# Patient Record
Sex: MALE | Race: WHITE | Employment: OTHER | URBAN - METROPOLITAN AREA
[De-identification: names, ages, dates, MRNs, and addresses within clinical notes are randomized per-mention and may not be internally consistent; named-entity substitution may affect disease eponyms.]

---

## 2018-10-22 ENCOUNTER — HOSPITAL ENCOUNTER (EMERGENCY)
Facility: CLINIC | Age: 28
Discharge: HOME | End: 2018-10-22
Attending: EMERGENCY MEDICINE | Admitting: EMERGENCY MEDICINE

## 2018-10-22 VITALS
HEIGHT: 70 IN | DIASTOLIC BLOOD PRESSURE: 80 MMHG | BODY MASS INDEX: 19.76 KG/M2 | SYSTOLIC BLOOD PRESSURE: 135 MMHG | RESPIRATION RATE: 18 BRPM | TEMPERATURE: 97.4 F | WEIGHT: 138 LBS | OXYGEN SATURATION: 99 %

## 2018-10-22 DIAGNOSIS — F10.929 ALCOHOLIC INTOXICATION WITH COMPLICATION (H): ICD-10-CM

## 2018-10-22 DIAGNOSIS — F99 MENTAL HEALTH DISORDER: ICD-10-CM

## 2018-10-22 PROCEDURE — 99282 EMERGENCY DEPT VISIT SF MDM: CPT

## 2018-10-22 NOTE — ED PROVIDER NOTES
"  History     Chief Complaint:  Psychiatric Evaluation    HPI   Jose Manuel Pham is a 28 year old male who presents by EMS for a psychiatric evaluation. the patient states that he is coming from [place] to visit the [facility] as he shares will hopefully bring him some happiness after a break up with his ex boyfriend, whom he lived with in [place]. On the flight, however, passengers complained about the patient touching himself inappropriately, leading to the police and paramedics escorting him after the flight landed. The patient denies these accusations and is questioning as to why he is here in the ED to begin with. The patient shares that he was addicted to crystal meth, but has been off of it for a month and a half, which has provoked him to drink at times to cope with his quitting, stating that drinking \"eases his mind\".  The patient also shares that he was in treatment at a dual diagnosis program in [place], and was on his fifth day before leaving the program prematurely 2 days ago. Currently, he denies any thoughts of hurting himself or others.  He admits to drinking alcohol today.  His request is to be discharged once that he can get his belongings and go to the hotel he has already booked.  Breathalyzer was 0.19 prior to arrival.    Allergies:  No Known Drug Allergies    Medications:    The patient is not currently taking any prescribed medications.    Past Medical History:    Bipolar disorder - per patient, diagnosed at treatment facility within past week    Past Surgical History:    History reviewed. No pertinent past surgical history.    Family History:    The patient denies any relevant family medical history.    Social History:  Marital Status: Single  Smoking Status: Yes  Alcohol Use: Yes    Review of Systems   All other systems reviewed and are negative.    Physical Exam     Patient Vitals for the past 24 hrs:   BP Temp Temp src Heart Rate Resp SpO2 Height Weight   10/22/18 1834 (!) 145/91 97.4  F " "(36.3  C) Oral 88 16 98 % 1.778 m (5' 10\") 62.6 kg (138 lb)       Physical Exam  General: Nontoxic-appearing male who ambulated into [facility]  HENT: mucous membranes moist  Eyes: PERRL without nystagmus  CV: extremities well perfused, regular rhythm  Resp:  normal effort, clear throughout  GI: abdomen soft and nontender, no guarding  MSK: no bony tenderness   Skin: appropriately warm and dry  Neuro: alert, clear speech, oriented, normal tone in extremities, ambulatory  Psych:  calm, cooperative, denies feeling suicidal, no evidence of hallucinations, good eye contact, occasionally slightly tearful when discussing recent breakup      Emergency Department Course     Emergency Department Course:  The patient arrived in the emergency department via EMS.    Past medical records, nursing notes, and vitals reviewed.  1818: I performed an exam of the patient and obtained history, as documented above.    The patient passed a \"road test\" prior to discharge from the ED.  The patient passed a PO challenge prior to discharge from the ED.    1904: I re checked the patient.  He continues to deny any acute psychiatric symptoms.     I rechecked the patient. Findings and plan explained to the Patient. Patient discharged home with instructions regarding supportive care, medications, and reasons to return. The importance of close follow-up was reviewed.     Impression & Plan      Medical Decision Making:  He admits to drinking alcohol tonight and apparently made some inappropriate gestures on the airplane, though he does not show any evidence of hallucinations at this time and adamantly and repeatedly volunteers the fact that he is not suicidal or homicidal.  I did not feel he could be kept further against as well.  He did not wish to be evaluated by DEC.  Decompensated psychiatric diseases were considered but I simply do not find evidence of such at this time.  Do not think he requires diagnostic testing.  Ultimately his request for " "discharge was granted.  He is welcome back for crisis at any hour.    Diagnosis:    ICD-10-CM   1. Alcoholic intoxication with complication (H) F10.929   2. Mental health disorder F99       Disposition:  discharged to home        This record was created at least in part using electronic voice recognition software, so please excuse any \"typos.\"    Kameron Teague  10/22/2018    EMERGENCY DEPARTMENT    I, Kameron Teague, am serving as a scribe at 6:18 PM on 10/22/2018 to document services personally performed by Ellis Velasquez MD* based on my observations and the provider's statements to me.        Ellis Velasquez MD  10/23/18 0044    "

## 2018-10-22 NOTE — ED AVS SNAPSHOT
Emergency Department    [address] [place] [address]    Phone:  [phone]    Fax:  [phone]                                       Jose Manuel Pham   MRN: [id number]    Department:   Emergency Department   Date of Visit:  10/22/2018           After Visit Summary Signature Page     I have received my discharge instructions, and my questions have been answered. I have discussed any challenges I see with this plan with the nurse or doctor.    ..........................................................................................................................................  Patient/Patient Representative Signature      ..........................................................................................................................................  Patient Representative Print Name and Relationship to Patient    ..................................................               ................................................  Date                                   Time    ..........................................................................................................................................  Reviewed by Signature/Title    ...................................................              ..............................................  Date                                               Time          22EPIC Rev 08/18

## 2018-10-22 NOTE — ED NOTES
Bed: [facility]  Expected date:   Expected time:   Means of arrival:   Comments:  yenifer 535 28M crisis eval hold ETA 1813     Blessing Knight, RIGOBERTO  10/22/18 1819

## 2018-10-22 NOTE — ED AVS SNAPSHOT
Emergency Department    [address] [place] [address]    Phone:  [phone]    Fax:  [phone]                                       Jose Manuel Pham   MRN: [id number]    Department:   Emergency Department   Date of Visit:  10/22/2018           Patient Information     Date Of Birth          1990        Your diagnoses for this visit were:     Alcoholic intoxication with complication (H)     Mental health disorder        You were seen by Ellis Velasquez MD.      Follow-up Information     Follow up with your treatment facility. Schedule an appointment as soon as possible for a visit in 3 days.        Follow up with nearest ER.    Why:  As needed for crisis        Discharge Instructions       Discharge Instructions  Mental Health Concerns    You were seen today for mental health concerns, such as depression, anxiety, or suicidal thinking. Your provider feels that you do not require hospitalization at this time. However, your symptoms may become worse, and you may need to return to the Emergency Department. Most treatments of depression and suicidal thoughts are a process rather than a single intervention.  Medications and counseling can take several weeks or more to help.    Generally, every Emergency Department visit should have a follow-up clinic visit with either a primary or a specialty clinic/provider. Please follow-up as instructed by your emergency provider today.    By accepting these discharge instructions:    You promise to not harm yourself or others.    You agree that if you feel you are becoming unable to keep that promise, you will do something to help yourself before you do anything to harm yourself or others.     You agree to keep any safety plan arranged on your visit here today.    You agree to take any medication prescribed or recommended by your provider.    If you are getting worse, you can contact a friend or a family member, contact your counselor or  family provider, contact a crisis line, or other options discussed with the provider or therapist today.    At any time, you can call 911 and return to the Emergency Department for more help.    You understand that follow-up is essential to your treatment, and you will make and keep appointments recommended on your visit today.    How to improve your mental health and prevent suicide:    Involve others by letting family, friends, counselors know.  Do not isolate yourself.    Avoid alcohol or drugs. Remove weapons, poisons from your home.    Try to stick to routines for eating, sleeping and getting regular exercise.      Try to get into sunlight. Bright natural light not only treats seasonal affective disorder but also depression.    Increase safe activities that you enjoy.    If you feel worse, contact [phone] ([phone]), or call 911, or your primary provider/counselor for additional assistance.    If you were given a prescription for medicine here today, be sure to read all of the information (including the package insert) that comes with your prescription.  This will include important information about the medicine, its side effects, and any warnings that you need to know about.  The pharmacist who fills the prescription can provide more information and answer questions you may have about the medicine.  If you have questions or concerns that the pharmacist cannot address, please call or return to the Emergency Department.   Remember that you can always come back to the Emergency Department if you are not able to see your regular provider in the amount of time listed above, if you get any new symptoms, or if there is anything that worries you.      Discharge References/Attachments     ALCOHOL INTOXICATION (ENGLISH)      24 Hour Appointment Hotline       To make an appointment at any [facility], call [phone] ([phone]). If you don't have a family doctor or clinic, we will help you find  one. [facility] are conveniently located to serve the needs of you and your family.             Review of your medicines      Notice     You have not been prescribed any medications.            Orders Needing Specimen Collection     None      Pending Results     No orders found from 10/20/2018 to 10/23/2018.            Pending Culture Results     No orders found from 10/20/2018 to 10/23/2018.            Pending Results Instructions     If you had any lab results that were not finalized at the time of your Discharge, you can call the ED Lab Result RN at [phone]. You will be contacted by this team for any positive Lab results or changes in treatment. The nurses are available 7 days a week from 10A to 6:30P.  You can leave a message 24 hours per day and they will return your call.        Test Results From Your Hospital Stay               Clinical Quality Measure: Blood Pressure Screening     Your blood pressure was checked while you were in the emergency department today. The last reading we obtained was  BP: (!) 145/91 . Please read the guidelines below about what these numbers mean and what you should do about them.  If your systolic blood pressure (the top number) is less than 120 and your diastolic blood pressure (the bottom number) is less than 80, then your blood pressure is normal. There is nothing more that you need to do about it.  If your systolic blood pressure (the top number) is 120-139 or your diastolic blood pressure (the bottom number) is 80-89, your blood pressure may be higher than it should be. You should have your blood pressure rechecked within a year by a primary care provider.  If your systolic blood pressure (the top number) is 140 or greater or your diastolic blood pressure (the bottom number) is 90 or greater, you may have high blood pressure. High blood pressure is treatable, but if left untreated over time it can put you at risk for heart attack, stroke, or kidney failure. You  "should have your blood pressure rechecked by a primary care provider within the next 4 weeks.  If your provider in the emergency department today gave you specific instructions to follow-up with your doctor or provider even sooner than that, you should follow that instruction and not wait for up to 4 weeks for your follow-up visit.        Thank you for choosing [facility]       Thank you for choosing [facility] for your care. Our goal is always to provide you with excellent care. Hearing back from our patients is one way we can continue to improve our services. Please take a few minutes to complete the written survey that you may receive in the mail after you visit with us. Thank you!        [employer] Your Campaign Information     [facility] [employer] lets you send messages to your doctor, view your test results, renew your prescriptions, schedule appointments and more. To sign up, go to [email or web address] . Click on \"Log in\" on the left side of the screen, which will take you to the Welcome page. Then click on \"Sign up Now\" on the right side of the page.     You will be asked to enter the access code listed below, as well as some personal information. Please follow the directions to create your username and password.     Your access code is: [email or web address]  Expires: 2019  7:12 PM     Your access code will  in 90 days. If you need help or a new code, please call your [facility] clinic or [phone].        Care EveryWhere ID     This is your Care EveryWhere ID. This could be used by other organizations to access your [facility] medical records  [id number]        Equal Access to Services     AIDA JACKSON : Hadii mable oneal hadasho Sojamesali, waaxda luqadaha, qaybta kaalmada bayronyalebron, jorden maxwell . So [facility] [phone].    ATENCIÓN: Si habla español, tiene a lang disposición servicios gratuitos de asistencia lingüística. Llame al [phone].    We comply with applicable federal civil rights laws and [place] " laws. We do not discriminate on the basis of race, color, national origin, age, disability, sex, sexual orientation, or gender identity.            After Visit Summary       This is your record. Keep this with you and show to your community pharmacist(s) and doctor(s) at your next visit.

## 2018-10-23 NOTE — ED NOTES
Pt will uber back to airport for his things. He is walking with a steady gait w/o assistance. MD is okay with this plan.

## 2022-06-01 NOTE — DISCHARGE INSTRUCTIONS
Discharge Instructions  Mental Health Concerns    You were seen today for mental health concerns, such as depression, anxiety, or suicidal thinking. Your provider feels that you do not require hospitalization at this time. However, your symptoms may become worse, and you may need to return to the Emergency Department. Most treatments of depression and suicidal thoughts are a process rather than a single intervention.  Medications and counseling can take several weeks or more to help.    Generally, every Emergency Department visit should have a follow-up clinic visit with either a primary or a specialty clinic/provider. Please follow-up as instructed by your emergency provider today.    By accepting these discharge instructions:    You promise to not harm yourself or others.    You agree that if you feel you are becoming unable to keep that promise, you will do something to help yourself before you do anything to harm yourself or others.     You agree to keep any safety plan arranged on your visit here today.    You agree to take any medication prescribed or recommended by your provider.    If you are getting worse, you can contact a friend or a family member, contact your counselor or family provider, contact a crisis line, or other options discussed with the provider or therapist today.    At any time, you can call 911 and return to the Emergency Department for more help.    You understand that follow-up is essential to your treatment, and you will make and keep appointments recommended on your visit today.    How to improve your mental health and prevent suicide:    Involve others by letting family, friends, counselors know.  Do not isolate yourself.    Avoid alcohol or drugs. Remove weapons, poisons from your home.    Try to stick to routines for eating, sleeping and getting regular exercise.      Try to get into sunlight. Bright natural light not only treats seasonal affective disorder but also  depression.    Increase safe activities that you enjoy.    If you feel worse, contact [phone] ([phone]), or call 911, or your primary provider/counselor for additional assistance.    If you were given a prescription for medicine here today, be sure to read all of the information (including the package insert) that comes with your prescription.  This will include important information about the medicine, its side effects, and any warnings that you need to know about.  The pharmacist who fills the prescription can provide more information and answer questions you may have about the medicine.  If you have questions or concerns that the pharmacist cannot address, please call or return to the Emergency Department.   Remember that you can always come back to the Emergency Department if you are not able to see your regular provider in the amount of time listed above, if you get any new symptoms, or if there is anything that worries you.     01-Jun-2022 17:05